# Patient Record
Sex: FEMALE | HISPANIC OR LATINO | Employment: FULL TIME | URBAN - METROPOLITAN AREA
[De-identification: names, ages, dates, MRNs, and addresses within clinical notes are randomized per-mention and may not be internally consistent; named-entity substitution may affect disease eponyms.]

---

## 2018-05-17 ENCOUNTER — OFFICE VISIT (OUTPATIENT)
Dept: URGENT CARE | Facility: CLINIC | Age: 23
End: 2018-05-17

## 2018-05-17 ENCOUNTER — NON-PROVIDER VISIT (OUTPATIENT)
Dept: URGENT CARE | Facility: CLINIC | Age: 23
End: 2018-05-17

## 2018-05-17 DIAGNOSIS — Z01.89 RESPIRATORY CLEARANCE EXAMINATION, ENCOUNTER FOR: ICD-10-CM

## 2018-05-17 PROCEDURE — 94375 RESPIRATORY FLOW VOLUME LOOP: CPT | Performed by: NURSE PRACTITIONER

## 2018-05-17 PROCEDURE — 8916 PR CLEARANCE ONLY: Performed by: NURSE PRACTITIONER

## 2018-05-18 NOTE — PROGRESS NOTES
Priscilla García is a 22 y.o. female here for a non-provider visit for Mask Fit/ Respiratory Clearance    If abnormal was an in office provider notified today (if so, indicate provider)? Yes  Routed to PCP? No

## 2019-07-21 ENCOUNTER — OCCUPATIONAL MEDICINE (OUTPATIENT)
Dept: URGENT CARE | Facility: CLINIC | Age: 24
End: 2019-07-21
Payer: COMMERCIAL

## 2019-07-21 VITALS
SYSTOLIC BLOOD PRESSURE: 132 MMHG | HEART RATE: 78 BPM | TEMPERATURE: 98.7 F | WEIGHT: 219 LBS | BODY MASS INDEX: 37.39 KG/M2 | RESPIRATION RATE: 20 BRPM | DIASTOLIC BLOOD PRESSURE: 86 MMHG | OXYGEN SATURATION: 97 % | HEIGHT: 64 IN

## 2019-07-21 DIAGNOSIS — S39.012A STRAIN OF LUMBAR REGION, INITIAL ENCOUNTER: ICD-10-CM

## 2019-07-21 DIAGNOSIS — M62.830 SPASM OF MUSCLE OF LOWER BACK: ICD-10-CM

## 2019-07-21 PROCEDURE — 99214 OFFICE O/P EST MOD 30 MIN: CPT | Mod: 29 | Performed by: PHYSICIAN ASSISTANT

## 2019-07-21 RX ORDER — CYCLOBENZAPRINE HCL 5 MG
5-10 TABLET ORAL
Qty: 7 TAB | Refills: 0 | Status: SHIPPED | OUTPATIENT
Start: 2019-07-21 | End: 2019-07-28

## 2019-07-21 ASSESSMENT — ENCOUNTER SYMPTOMS: BACK PAIN: 1

## 2019-07-21 NOTE — LETTER
"EMPLOYEE’S CLAIM FOR COMPENSATION/ REPORT OF INITIAL TREATMENT  FORM C-4    EMPLOYEE’S CLAIM - PROVIDE ALL INFORMATION REQUESTED   First Name  Priscilla Last Name  Ricky Birthdate                    1995                Sex  female Claim Number   Home Address  [address] [facility] Age  23 y.o. Height  1.626 m (5' 4\") Weight  99.3 kg (219 lb) [facility]     [facility] Zip  [address] Telephone  [phone] (home)    Mailing Address  [address] Zip  [address] Primary Language Spoken  English    Insurer  Unknown Third Party   [employer]   Employee's Occupation (Job Title) When Injury or Occupational Disease Occurred      Employer's Name  [employer]  Telephone  [phone]    Employer Address  [address]  [facility]  Zip  [address]    Date of Injury  7/18/2019               Hour of Injury  3:00 PM Date Employer Notified  7/21/2019 Last Day of Work after Injury or Occupational Disease  7/18/2019 Supervisor to Whom Injury Reported  Cj   Address or Location of Accident (if applicable)  [[address]   What were you doing at the time of accident? (if applicable)  lifting cell trays    How did this injury or occupational disease occur? (Be specific an answer in detail. Use additional sheet if necessary)  I was picking up cell trays and I felt a little bit of pain but I ignored it and became worse as the days went by.   If you believe that you have an occupational disease, when did you first have knowledge of the disability and it relationship to your employment?  n/a Witnesses to the Accident  co workers      Nature of Injury or Occupational Disease  Strain  Part(s) of Body Injured or Affected  Lower Back Area (Lumbar Area & Lumbo-Sacral), Defer, Defer    I certify that the above is true and correct to the best of my knowledge and that I have provided this " information in order to obtain the benefits of [place]  I hereby authorize any physician, chiropractor, surgeon, practitioner, or other person, any hospital, including [facility] or [facility], any medical service organization, any insurance company, or other institution or organization to release to each other, any medical or other information, including benefits paid or payable, pertinent to this injury or disease, except information relative to diagnosis, treatment and/or counseling for AIDS, psychological conditions, alcohol or controlled substances, for which I must give specific authorization.  A Photostat of this authorization shall be as valid as the original.     Date   Place   Employee’s Signature   THIS REPORT MUST BE COMPLETED AND MAILED WITHIN 3 WORKING DAYS OF TREATMENT   Place  [facility]  Name of Facility  [facility]   Date  7/21/2019 Diagnosis  (S39.012A) Strain of lumbar region, initial encounter  (M62.830) Spasm of muscle of lower back Is there evidence the injured employee was under the influence of alcohol and/or another controlled substance at the time of accident?   Hour  2:32 PM Description of Injury or Disease  Diagnoses of Strain of lumbar region, initial encounter and Spasm of muscle of lower back were pertinent to this visit. No   Treatment  Plan:  Light Duty Work Restrictions  Flexeril 5mg PO at night for pain/spasm  --Advised the patient to only take this medication at night, as it may cause drowsiness.  Instructed the patient not to take this medication while at work, operating machinery, or driving.  OTC NSAIDs for pain/discomfort  Apply ice and/or heat for symptomatic relief  Follow-up in 4 days for reassessment   Return to clinic sooner if symptoms worsen, or if the patient did develop worsening/increasing low back pain, radiation of pain,  "numbness, tingling, or weakness to her extremities, incontinence of bladder/bowel, paresthesias, difficulty walking, fever/chills, and or any concerning symptoms.    Have you advised the patient to remain off work five days or more? No   X-Ray Findings    Comments:N/A   If Yes   From Date  To Date      From information given by the employee, together with medical evidence, can you directly connect this injury or occupational disease as job incurred?  Yes If No Full Duty  No Modified Duty  Yes   Is additional medical care by a physician indicated?  Yes If Modified Duty, Specify any Limitations / Restrictions  See D-39   Do you know of any previous injury or disease contributing to this condition or occupational disease?                            No   Date  7/21/2019 Print Doctor’s Name Fco Castillo P.A.-C. I certify the employer’s copy of  this form was mailed on:   Address  [address] [place] [address] Insurer’s Use Only     [facility]  [address]    Provider’s Tax ID Number  [id number] Telephone  Dept: [phone]        e-FCO Lai P.A.-C.   e-Signature: Dr. Awais Burger, Medical Director Degree  JOHN        ORIGINAL-TREATING PHYSICIAN OR CHIROPRACTOR    PAGE 2-INSURER/TPA    PAGE 3-EMPLOYER    PAGE 4-EMPLOYEE             Form C-4 (rev10/07)              BRIEF DESCRIPTION OF RIGHTS AND BENEFITS  (Pursuant to [place]RS 616C.050)    Notice of Injury or Occupational Disease (Incident Report Form C-1): If an injury or occupational disease (OD) arises out of and in the  course of employment, you must provide written notice to your employer as soon as practicable, but no later than 7 days after the accident or  OD. Your employer shall maintain a sufficient supply of the required forms.    Claim for Compensation (Form C-4): If medical treatment is sought, the form C-4 is available at the place of initial treatment. A completed  \"Claim for Compensation\" (Form C-4) must be filed within 90 days after an " accident or OD. The treating physician or chiropractor must,  within 3 working days after treatment, complete and mail to the employer, the employer's insurer and third-party , the Claim for  Compensation.    Medical Treatment: If you require medical treatment for your on-the-job injury or OD, you may be required to select a physician or  chiropractor from a list provided by your workers’ compensation insurer, if it has contracted with an Organization for Managed Care (MCO) or  Preferred Provider Organization (PPO) or providers of health care. If your employer has not entered into a contract with an MCO or PPO, you  may select a physician or chiropractor from the Panel of Physicians and Chiropractors. Any medical costs related to your industrial injury or  OD will be paid by your insurer.    Temporary Total Disability (TTD): If your doctor has certified that you are unable to work for a period of at least 5 consecutive days, or 5  cumulative days in a 20-day period, or places restrictions on you that your employer does not accommodate, you may be entitled to TTD  compensation.    Temporary Partial Disability (TPD): If the wage you receive upon reemployment is less than the compensation for TTD to which you are  entitled, the insurer may be required to pay you TPD compensation to make up the difference. TPD can only be paid for a maximum of 24  months.    Permanent Partial Disability (PPD): When your medical condition is stable and there is an indication of a PPD as a result of your injury or  OD, within 30 days, your insurer must arrange for an evaluation by a rating physician or chiropractor to determine the degree of your PPD. The  amount of your PPD award depends on the date of injury, the results of the PPD evaluation and your age and wage.    Permanent Total Disability (PTD): If you are medically certified by a treating physician or chiropractor as permanently and totally disabled  and have been  granted a PTD status by your insurer, you are entitled to receive monthly benefits not to exceed 66 2/3% of your average  monthly wage. The amount of your PTD payments is subject to reduction if you previously received a PPD award.    Vocational Rehabilitation Services: You may be eligible for vocational rehabilitation services if you are unable to return to the job due to a  permanent physical impairment or permanent restrictions as a result of your injury or occupational disease.    Transportation and Per Cristopher Reimbursement: You may be eligible for travel expenses and per cristopher associated with medical treatment.    Reopening: You may be able to reopen your claim if your condition worsens after claim closure.    Appeal Process: If you disagree with a written determination issued by the insurer or the insurer does not respond to your request, you may  appeal to the Department of Administration, , by following the instructions contained in your determination letter. You must  appeal the determination within 70 days from the date of the determination letter at [address], [address], [place]  [address], or [address], [address], [place] [address]. If you disagree with the  decision, you may appeal to the  Department of Administration, . You must file your appeal within 30 days from the date of the  decision  letter at [address], [address], [place], [place] [address], or [address], [address], [place] [address]. If you  disagree with a decision of an , you may file a petition for judicial review with the District Court. You must do so within 30  days of the Appeal Officer’s decision. You may be represented by an  at your own expense or you may contact the [facility] for possible  representation.    [place]  for Injured Workers ([place]IW): If you disagree with a   decision, you may request that [facility] represent you  without charge at an  Hearing. For information regarding denial of benefits, you may contact the [facility] at: [address] [facility], [address], [place] [address], [phone], or [address], [address], [place] [address], [phone]    To File a Complaint with the Division: If you wish to file a complaint with the  of the Division of Industrial Relations (DIR),  please contact the Workers’ Compensation Section, [address], [address], [place], [place] [address], telephone ([phone]) [phone], or  [address], [address], [place], [place] [address], telephone ([phone]) [phone].    For assistance with Workers’ Compensation Issues: you may contact the Office of the Governor Consumer Health Assistance, [address] [place] [address], [address], [place], [place] [address], Toll Free 1-318.343.3201, Web site: http://govcha.[facility].nv., E-mail  [email or web address].[email or web address].                                                                                                                                                                                                                                   __________________________________________________________________                                                                   _________________                Employee Name / Signature                                                                                                                                                       Date                                                                                                                                                                                                     D-2 (rev. 10/07)

## 2019-07-21 NOTE — LETTER
"   [facility]  [address] [facility] [address] [place] [address]  Phone:  [phone] - Fax:  [phone]   [facility] Progress Report and Disability Certification  Date of Service: 7/21/2019   No Show:  No  Date / Time of Next Visit: 7/25/2019 @ 11 am   Claim Information   Patient Name: Priscilla García  Claim Number:     Employer: [employer]  Date of Injury: 7/18/2019     Insurer / TPA: [employer]  ID / SSN:     Occupation:   Diagnosis: Diagnoses of Strain of lumbar region, initial encounter and Spasm of muscle of lower back were pertinent to this visit.    Medical Information   Related to Industrial Injury? Yes    Subjective Complaints:  The patient presents to clinic for initial Workmen's Comp. evaluation.    DOI: 7/18/19 - The patient presents to clinic secondary to low back pain. The patient developed low back pain while at work on Thursday. The patient reports repetitive lifting of \"cell trays\" weighing approx. 30lbs. The patient developed low back pain/discomfort at the end of the day on Thursday. The patient reports continued back pain/discomfort on Friday. Yesterday, the patient developed severe back pain after reaching down to pick something up. The patient states she felt something pull in her right lower back with immediate pain. The patient reports increasing pain with movement, positions, and walking. The patient denies radiation of pain, numbness, tingling, or weakness to her extremities, incontinence of bladder/bowel, paresthesias, and fever.  The patient has taken IBU for her symptoms. The patient reports no prior back injury. She denies a second job.   Objective Findings: Lumbar Spine:  Tenderness to palpation of the right paraspinal region of the lumbar spine. No midline/bony tenderness.   Decreased ROM - the patient demonstrates decreased forward bending secondary to pain  Neurovascular intact  Strength 5/5 - equal bilateral lower " extremities  Straight Leg Raise - negative   Antalgic gait.    Pre-Existing Condition(s):     Assessment:   Initial Visit    Status: Additional Care Required  Permanent Disability:No    Plan: Medication (NOT at Work)    Diagnostics:      Comments:  Plan:  Light Duty Work Restrictions  Flexeril 5mg PO at night for pain/spasm  --Advised the patient to only take this medication at night, as it may cause drowsiness.  Instructed the patient not to take this medication while at work, operating dougie  kvng, or driving.  OTC NSAIDs for pain/discomfort  Apply ice and/or heat for symptomatic relief  Follow-up in 4 days for reassessment   Return to clinic sooner if symptoms worsen, or if the patient did develop worsening/increasing low back pain, radia  tion of pain, numbness, tingling, or weakness to her extremities, incontinence of bladder/bowel, paresthesias, difficulty walking, fever/chills, and or any concerning symptoms.      Disability Information   Status: Released to Restricted Duty    From:  2019  Through: 2019 Restrictions are: Temporary   Physical Restrictions   Sitting:    Standing:  < or = to 2 hrs/day Stoopin hrs/day Bendin hrs/day   Squattin hrs/day Walking:  < or = to 2 hrs/day Climbin hrs/day Pushing:      Pulling:    Other:    Reaching Above Shoulder (L):   Reaching Above Shoulder (R):       Reaching Below Shoulder (L):    Reaching Below Shoulder (R):      Not to exceed Weight Limits   Carrying(hrs):   Weight Limit(lb): < or = to 10 pounds Lifting(hrs):   Weight  Limit(lb): < or = to 10 pounds   Comments:      Repetitive Actions   Hands: i.e. Fine Manipulations from Grasping:     Feet: i.e. Operating Foot Controls:     Driving / Operate Machinery:     Physician Name: Fco Castillo P.A.-C. Physician Signature: FCO Orellana P.A.-C. e-Signature: Dr. Awais Burger, Medical Director   Clinic Name / Location: [address]  [place]  [address] Clinic Phone Number: Dept: [phone]   Appointment Time: 1:45 Pm Visit Start Time: 2:32 PM   Check-In Time:  1:46 Pm Visit Discharge Time: 3:33 Pm    Original-Treating Physician or Chiropractor    Page 2-Insurer/TPA    Page 3-Employer    Page 4-Employee

## 2019-07-21 NOTE — PROGRESS NOTES
"Subjective:      Priscilla García is a 23 y.o. female who presents with Back Injury (NEW WC DOI-7/18/19-lower back)        HPI   The patient presents to clinic for initial Workmen's Comp. evaluation.    DOI: 7/18/19 - The patient presents to clinic secondary to low back pain. The patient developed low back pain while at work on Thursday. The patient reports repetitive lifting of \"cell trays\" weighing approx. 30lbs. The patient developed low back pain/discomfort at the end of the day on Thursday. The patient reports continued back pain/discomfort on Friday. Yesterday, the patient developed severe back pain after reaching down to pick something up. The patient states she felt something pull in her right lower back with immediate pain. The patient reports increasing pain with movement, positions, and walking. The patient denies radiation of pain, numbness, tingling, or weakness to her extremities, incontinence of bladder/bowel, paresthesias, and fever.  The patient has taken IBU for her symptoms. The patient reports no prior back injury. She denies a second job.      PMH:  has no past medical history on file.  MEDS: No current outpatient prescriptions on file.  ALLERGIES: No Known Allergies  SURGHX: No past surgical history on file.  SOCHX:  reports that she has never smoked. She has never used smokeless tobacco.  FH: Family history was reviewed, no pertinent findings to report      Review of Systems   Constitutional: Negative for fever.   HENT: Negative for congestion, ear pain and sore throat.    Eyes: Negative for discharge and redness.   Respiratory: Negative for cough.    Cardiovascular: Negative for chest pain.   Gastrointestinal: Negative for abdominal pain, nausea and vomiting.   Musculoskeletal: Positive for back pain.   Skin: Negative for rash.   Neurological: Negative for headaches.          Objective:     /86 (BP Location: Right arm)   Pulse 78   Temp 37.1 °C (98.7 °F) (Temporal)   Resp 20   Ht 1.626 " "m (5' 4\")   Wt 99.3 kg (219 lb)   SpO2 97%   BMI 37.59 kg/m²      Physical Exam   Constitutional: She is oriented to person, place, and time. She appears well-developed and well-nourished.   HENT:   Head: Normocephalic and atraumatic.   Right Ear: External ear normal.   Left Ear: External ear normal.   Nose: Nose normal.   Eyes: Conjunctivae and EOM are normal.   Neck: Normal range of motion. Neck supple.   Cardiovascular: Normal rate.    Pulmonary/Chest: Effort normal.   Musculoskeletal:        Back:    Lumbar Spine:  Tenderness to palpation of the right paraspinal region of the lumbar spine. No midline/bony tenderness.   Decreased ROM - the patient demonstrates decreased forward bending secondary to pain  Neurovascular intact  Strength 5/5 - equal bilateral lower extremities  Straight Leg Raise - negative   Antalgic gait.    Neurological: She is alert and oriented to person, place, and time.   Skin: Skin is warm and dry.               Assessment/Plan:     1. Strain of lumbar region, initial encounter    2. Spasm of muscle of lower back  - cyclobenzaprine (FLEXERIL) 5 MG tablet; Take 1-2 Tabs by mouth at bedtime as needed for up to 7 days.  Dispense: 7 Tab; Refill: 0      Plan:  Light Duty Work Restrictions  Flexeril 5mg PO at night for pain/spasm  --Advised the patient to only take this medication at night, as it may cause drowsiness.  Instructed the patient not to take this medication while at work, operating machinery, or driving.  OTC NSAIDs for pain/discomfort  Apply ice and/or heat for symptomatic relief  Follow-up in 4 days for reassessment   Return to clinic sooner if symptoms worsen, or if the patient did develop worsening/increasing low back pain, radiation of pain, numbness, tingling, or weakness to her extremities, incontinence of bladder/bowel, paresthesias, difficulty walking, fever/chills, and or any concerning symptoms.    Discussed plan with the patient, and she agrees to the above.     "

## 2019-07-24 ASSESSMENT — ENCOUNTER SYMPTOMS
EYE DISCHARGE: 0
ABDOMINAL PAIN: 0
NAUSEA: 0
HEADACHES: 0
VOMITING: 0
EYE REDNESS: 0
SORE THROAT: 0
COUGH: 0
FEVER: 0

## 2019-07-25 ENCOUNTER — OCCUPATIONAL MEDICINE (OUTPATIENT)
Dept: URGENT CARE | Facility: CLINIC | Age: 24
End: 2019-07-25
Payer: COMMERCIAL

## 2019-07-25 VITALS
DIASTOLIC BLOOD PRESSURE: 78 MMHG | SYSTOLIC BLOOD PRESSURE: 124 MMHG | TEMPERATURE: 97.7 F | WEIGHT: 215 LBS | OXYGEN SATURATION: 100 % | RESPIRATION RATE: 18 BRPM | BODY MASS INDEX: 36.7 KG/M2 | HEIGHT: 64 IN | HEART RATE: 77 BPM

## 2019-07-25 DIAGNOSIS — S39.012D STRAIN OF LUMBAR REGION, SUBSEQUENT ENCOUNTER: ICD-10-CM

## 2019-07-25 PROCEDURE — 99214 OFFICE O/P EST MOD 30 MIN: CPT | Mod: 29 | Performed by: PHYSICIAN ASSISTANT

## 2019-07-25 RX ORDER — CYCLOBENZAPRINE HCL 5 MG
5-10 TABLET ORAL
Qty: 5 TAB | Refills: 0 | Status: SHIPPED | OUTPATIENT
Start: 2019-07-25

## 2019-07-25 RX ORDER — CYCLOBENZAPRINE HCL 5 MG
5-10 TABLET ORAL 2 TIMES DAILY PRN
Qty: 5 TAB | Refills: 0 | Status: SHIPPED | OUTPATIENT
Start: 2019-07-25 | End: 2019-07-25

## 2019-07-25 ASSESSMENT — ENCOUNTER SYMPTOMS
NUMBNESS: 0
PARESIS: 0
PERIANAL NUMBNESS: 0
ABDOMINAL PAIN: 0
PARESTHESIAS: 0
BACK PAIN: 1
BOWEL INCONTINENCE: 0
WEAKNESS: 0
LEG PAIN: 0
TINGLING: 0

## 2019-07-25 ASSESSMENT — PAIN SCALES - GENERAL: PAINLEVEL: 4=SLIGHT-MODERATE PAIN

## 2019-07-25 NOTE — PROGRESS NOTES
I agree with the assessment and plan of aCrley Klein PA-C  I personally examined the patient.    Reagan Sinha P.A.-C.

## 2019-07-25 NOTE — LETTER
"   [facility] [address] [place] [address]  Phone:  [phone] - Fax:  [phone]   [facility] Progress Report and Disability Certification  Date of Service: 7/25/2019   No Show:  No  Date / Time of Next Visit: 8/1/2019 @12 pm   Claim Information   Patient Name: Priscilla García  Claim Number:     Employer: [employer]  Date of Injury: 7/18/2019     Insurer / TPA: [employer]  ID / SSN:     Occupation:   Diagnosis: There were no encounter diagnoses.    Medical Information   Related to Industrial Injury?      Subjective Complaints:  DOI: 7/18/19  Patient present for 1st WC follow up after injury sustained picking up \"cell packs\" at work. She states her symptoms are improving. Flexeril taken at [facility] has been helping the pain as well. Denies numbness, tingling, bowel/bladder incontinence, or midline back pain. Patient is able to preform restricted duties while at work.    Objective Findings: Generally well appearing patient in no acute distress   CVS: rrr, nu murmur   Resp: CTA bilaterally   MSK: NTTP over spine. Mild tenderness with palpation of right SI joint and lumbar Paraspinous muscles.   Neurovascular intact    Pre-Existing Condition(s):     Assessment:   Condition Improved    Status: Additional Care Required  Permanent Disability:No    Plan: Medication (NOT at Work)    Diagnostics:      Comments:       Disability Information   Status: Released to Restricted Duty    From:  7/25/2019  Through: 8/1/2019 Restrictions are: Temporary   Physical Restrictions   Sitting:    Standing:    Stooping:  < or = to 6 hrs/day Bending:  < or = to 6 hrs/day   Squatting:    Walking:    Climbing:  < or = to 6 hrs/day Pushing:  < or = to 6 hrs/day   Pulling:  < or = to 6 hrs/day Other:    Reaching Above Shoulder (L):   Reaching Above Shoulder (R):       Reaching Below Shoulder (L):    Reaching Below Shoulder (R):      Not to exceed Weight Limits   "   Carrying(hrs):   Weight Limit(lb): < or = to 10 pounds Lifting(hrs):   Weight  Limit(lb):     Comments:      Repetitive Actions   Hands: i.e. Fine Manipulations from Grasping:     Feet: i.e. Operating Foot Controls:     Driving / Operate Machinery:     Physician Name: Reagan Sinha P.A.-C. Physician Signature:   e-Signature: Dr. Awais Burger, Medical Director   Clinic Name / Location: [address] [address] Clinic Phone Number: Dept: [phone]   Appointment Time: 11:00 Am Visit Start Time: 11:16 AM   Check-In Time:  11:08 Am Visit Discharge Time:  12:28 PM   Original-Treating Physician or Chiropractor    Page 2-Insurer/TPA    Page 3-Employer    Page 4-Employee

## 2019-07-25 NOTE — PROGRESS NOTES
"Subjective:      Priscilla García is a 23 y.o. female who presents with Follow-Up (W/C)      DOI: 7/18/19  Patient present for 1st  follow up after injury sustained picking up \"cell packs\" at work. She states her symptoms are improving. Flexeril taken at [facility] has been helping the pain as well. Denies numbness, tingling, bowel/bladder incontinence, or midline back pain. Patient is able to preform restricted duties while at work.      Back Pain   This is a new problem. The current episode started in the past 7 days. The problem occurs constantly. The problem has been gradually improving since onset. The pain is present in the sacro-iliac. The quality of the pain is described as aching and stabbing. The pain does not radiate. The pain is mild. The pain is the same all the time. The symptoms are aggravated by bending, position and twisting. Pertinent negatives include no abdominal pain, bladder incontinence, bowel incontinence, leg pain, numbness, paresis, paresthesias, perianal numbness, tingling or weakness. She has tried muscle relaxant for the symptoms. The treatment provided moderate relief.       Review of Systems   Gastrointestinal: Negative for abdominal pain and bowel incontinence.   Genitourinary: Negative for bladder incontinence.   Musculoskeletal: Positive for back pain.   Neurological: Negative for tingling, weakness, numbness and paresthesias.   All other systems reviewed and are negative.      PMH:  has no past medical history on file.  MEDS:   Current Outpatient Prescriptions:   •  cyclobenzaprine (FLEXERIL) 5 MG tablet, Take 1-2 Tabs by mouth at bedtime as needed for up to 7 days., Disp: 7 Tab, Rfl: 0  ALLERGIES: No Known Allergies  SURGHX: No past surgical history on file.  SOCHX:  reports that she has never smoked. She has never used smokeless tobacco.  FH: Reviewed with patient, not pertinent to this visit.      Objective:     /78 (BP Location: Left arm, Patient Position: Sitting, BP Cuff Size: " "Adult)   Pulse 77   Temp 36.5 °C (97.7 °F) (Temporal)   Resp 18   Ht 1.626 m (5' 4\")   Wt 97.5 kg (215 lb)   SpO2 100%   BMI 36.90 kg/m²      Physical Exam   Constitutional: She is oriented to person, place, and time. She appears well-developed and well-nourished.   HENT:   Head: Normocephalic and atraumatic.   Mouth/Throat: Oropharynx is clear and moist.   Eyes: Conjunctivae and EOM are normal.   Neck: Normal range of motion. Neck supple.   Musculoskeletal:   Patient able to get on and off exam table without assistance or pain    Neurological: She is alert and oriented to person, place, and time.   Skin: Skin is warm and dry.   Psychiatric: She has a normal mood and affect. Her behavior is normal. Judgment and thought content normal.   Vitals reviewed.      Generally well appearing patient in no acute distress   CVS: rrr, nu murmur   Resp: CTA bilaterally   MSK: NTTP over spine. Mild tenderness with palpation of right SI joint and lumbar Paraspinous muscles.   Neurovascular intact        Assessment/Plan:     1. Strain of lumbar region, subsequent encounter  cyclobenzaprine (FLEXERIL) 5 MG tablet    DISCONTINUED: cyclobenzaprine (FLEXERIL) 5 MG tablet     PT should follow up in 1 week for re-evaluation of symptoms.   -OTC NSAIDs for pain  -continue Flexeril QHS, do not take while at work or while operating vehicle. May cause drowsiness.   - Continue with restricted duty at work   Discussed red flags and reasons to return to UC or ED.  Pt verbalized understanding of diagnosis and follow up instructions and was offered informational handout on diagnosis.  PT discharged.       "

## 2019-07-25 NOTE — PROGRESS NOTES
"Subjective:      Priscilla García is a 23 y.o. female who presents with Follow-Up (W/C)      DOI: 7/18/19  Patient present for 1st  follow up after injury sustained picking up \"cell packs\" at work. She states her symptoms are improving. Flexeril taken at [facility] has been helping the pain as well. Denies numbness, tingling, bowel/bladder incontinence, or midline back pain. Patient is able to preform restricted duties while at work.      HPI    ROS       Objective:     /78 (BP Location: Left arm, Patient Position: Sitting, BP Cuff Size: Adult)   Pulse 77   Temp 36.5 °C (97.7 °F) (Temporal)   Resp 18   Ht 1.626 m (5' 4\")   Wt 97.5 kg (215 lb)   SpO2 100%   BMI 36.90 kg/m²      Physical Exam    Generally well appearing patient in no acute distress   CVS: rrr, nu murmur   Resp: CTA bilaterally   MSK: NTTP over spine. Mild tenderness with palpation of right SI joint and lumbar Paraspinous muscles.   Neurovascular intact        Assessment/Plan:     1. Strain of lumbar region, subsequent encounter  ***  - cyclobenzaprine (FLEXERIL) 5 MG tablet; Take 1-2 Tabs by mouth every bedtime.  Dispense: 5 Tab; Refill: 0      "

## 2019-08-01 ENCOUNTER — OCCUPATIONAL MEDICINE (OUTPATIENT)
Dept: URGENT CARE | Facility: CLINIC | Age: 24
End: 2019-08-01
Payer: COMMERCIAL

## 2019-08-01 VITALS
BODY MASS INDEX: 36.7 KG/M2 | SYSTOLIC BLOOD PRESSURE: 122 MMHG | OXYGEN SATURATION: 98 % | RESPIRATION RATE: 16 BRPM | DIASTOLIC BLOOD PRESSURE: 70 MMHG | WEIGHT: 215 LBS | HEIGHT: 64 IN | TEMPERATURE: 98.5 F | HEART RATE: 85 BPM

## 2019-08-01 DIAGNOSIS — S39.012D STRAIN OF LUMBAR REGION, SUBSEQUENT ENCOUNTER: ICD-10-CM

## 2019-08-01 PROCEDURE — 99213 OFFICE O/P EST LOW 20 MIN: CPT | Mod: 29 | Performed by: PHYSICIAN ASSISTANT

## 2019-08-01 ASSESSMENT — ENCOUNTER SYMPTOMS
TINGLING: 0
BACK PAIN: 1
FEVER: 0
FOCAL WEAKNESS: 0
SENSORY CHANGE: 0

## 2019-08-01 NOTE — PROGRESS NOTES
"Subjective:   Priscilla García is a 23 y.o. female who presents for Work-Related Injury (W/C FV DOI 7/18/19 back injury. feeling better, able to perform ROM . )      DOI 7/18/19.  Patient presents today for reevaluation of back pain.  Patient was seen as a first visit on 7/21/19 with low back pain on the right side that began after lifting repetitively heavy cell trays weighing approximately 300 pounds on the date of injury 7/18/19.  The patient was felt to have lumbosacral strain and was prescribed Flexeril.  She was seen in follow-up on 7/25/19 with improvement but not resolution.  Patient presents today for reevaluation.  She reports 100% resolution of symptoms.  Patient denies any numbness, tingling, weakness of the extremities.  Denies any bowel or bladder incontinence or and explained fevers.   HPI  Review of Systems   Constitutional: Negative for fever.   Musculoskeletal: Positive for back pain.   Neurological: Negative for tingling, sensory change and focal weakness.   All other systems reviewed and are negative.    No Known Allergies     Objective:   /70   Pulse 85   Temp 36.9 °C (98.5 °F) (Temporal)   Resp 16   Ht 1.626 m (5' 4\")   Wt 97.5 kg (215 lb)   LMP 06/27/2019   SpO2 98%   BMI 36.90 kg/m²   Physical Exam   Constitutional: She is oriented to person, place, and time. She appears well-developed and well-nourished.   HENT:   Head: Normocephalic and atraumatic.   Right Ear: External ear normal.   Left Ear: External ear normal.   Nose: Nose normal.   Eyes: Pupils are equal, round, and reactive to light. Conjunctivae and EOM are normal.   Neck: Normal range of motion. Neck supple.   Cardiovascular: Normal rate, regular rhythm and normal heart sounds.   Pulmonary/Chest: Effort normal and breath sounds normal.   Musculoskeletal: Normal range of motion.        Thoracic back: Normal.        Lumbar back: Normal.   Lymphadenopathy:     She has no cervical adenopathy.   Neurological: She is alert and " oriented to person, place, and time. She has normal strength. No cranial nerve deficit or sensory deficit. Coordination normal.   Reflex Scores:       Patellar reflexes are 2+ on the right side and 2+ on the left side.       Achilles reflexes are 2+ on the right side and 2+ on the left side.  Skin: Skin is warm and dry. No rash noted.   Psychiatric: She has a normal mood and affect. Judgment normal.     Lumbar spine: No step-off or deformity.  No bony spinous process tenderness.  No muscle tenderness or paravertebral muscle spasm noted.  Full range of motion in all directions without limitation or pain.  DTRs: 2+ equal bilateral lower extremities.  Sensation grossly intact.  Motor strength 5/5 bilateral lower extremities.     Assessment/Plan:   Assessment    1. Strain of lumbar region, subsequent encounter  Patient is released to full duty at maximum medical improvement.  Differential diagnosis, natural history, supportive care, and indications for immediate follow-up discussed.       The patient demonstrated a good understanding and agreed with the treatment plan.  Please note that this note was created using voice recognition speech to text software. Every effort has been made to correct obvious errors.  However, I expect there are errors of grammar and possibly context that were not discovered prior to finalizing the note  STEPHANIE Hale PA-C

## 2019-08-01 NOTE — LETTER
[facility] [place] [id number] [place] [address]  Phone:  [phone] - Fax:  [phone]   [facility] Progress Report and Disability Certification  Date of Service: 8/1/2019   No Show:  No  Date / Time of Next Visit:  Discharged   Claim Information   Patient Name: Priscilla García  Claim Number:     Employer: [employer]  Date of Injury: 7/18/2019     Insurer / TPA: [employer]  ID / SSN:     Occupation:   Diagnosis: The encounter diagnosis was Strain of lumbar region, subsequent encounter.    Medical Information   Related to Industrial Injury?      Subjective Complaints:  DOI 7/18/19.  Patient presents today for reevaluation of back pain.  Patient was seen as a first visit on 7/21/19 with low back pain on the right side that began after lifting repetitively heavy cell trays weighing approximately 300 pounds on the date of injury 7/18/19.  The patient was felt to have lumbosacral strain and was prescribed Flexeril.  She was seen in follow-up on 7/25/19 with improvement but not resolution.  Patient presents today for reevaluation.  She reports 100% resolution of symptoms.  Patient denies any numbness, tingling, weakness of the extremities.  Denies any bowel or bladder incontinence or and explained fevers.   Objective Findings: Lumbar spine: No step-off or deformity.  No bony spinous process tenderness.  No muscle tenderness or paravertebral muscle spasm noted.  Full range of motion in all directions without limitation or pain.  DTRs: 2+ equal bilateral lower extremities.  Sensation grossly intact.  Motor strength 5/5 bilateral lower extremities.   Pre-Existing Condition(s):     Assessment:   Condition Improved    Status: Discharged /  MMI  Permanent Disability:     Plan:      Diagnostics:      Comments:       Disability Information   Status: Released to Full Duty    From:     Through:   Restrictions are:     Physical Restrictions   Sitting:   Standing:    Stooping:    Bending:      Squatting:    Walking:    Climbing:    Pushing:      Pulling:    Other:    Reaching Above Shoulder (L):   Reaching Above Shoulder (R):       Reaching Below Shoulder (L):    Reaching Below Shoulder (R):      Not to exceed Weight Limits   Carrying(hrs):   Weight Limit(lb):   Lifting(hrs):   Weight  Limit(lb):     Comments: Patient is released to full duty at maximum medical improvement.    Repetitive Actions   Hands: i.e. Fine Manipulations from Grasping:     Feet: i.e. Operating Foot Controls:     Driving / Operate Machinery:     Physician Name: Mary Hale P.A.-C. Physician Signature: MARY Marquez P.A.-C. e-Signature: Dr. Awais Burger, Medical Director   Clinic Name / Location: [address] [address] Clinic Phone Number: Dept: [phone]   Appointment Time: 12:00 Pm Visit Start Time: 12:34 PM   Check-In Time:  11:54 Am Visit Discharge Time:  1:31pm   Original-Treating Physician or Chiropractor    Page 2-Insurer/TPA    Page 3-Employer    Page 4-Employee